# Patient Record
Sex: FEMALE | URBAN - METROPOLITAN AREA
[De-identification: names, ages, dates, MRNs, and addresses within clinical notes are randomized per-mention and may not be internally consistent; named-entity substitution may affect disease eponyms.]

---

## 2017-10-03 DIAGNOSIS — Z86.69 H/O HYDROCEPHALUS: Primary | ICD-10-CM

## 2017-10-03 NOTE — NURSING NOTE
Elisha has new numbness near her eye that is worrisome to her - she is due for her annual CT, and we will have her new symptoms evaluated by Dr Moss

## 2017-10-21 ASSESSMENT — ENCOUNTER SYMPTOMS
SPUTUM PRODUCTION: 0
FEVER: 0
PANIC: 0
SHORTNESS OF BREATH: 1
DEPRESSION: 1
MEMORY LOSS: 0
NIGHT SWEATS: 0
DISTURBANCES IN COORDINATION: 0
POLYDIPSIA: 1
DYSPNEA ON EXERTION: 1
POSTURAL DYSPNEA: 0
CHILLS: 1
POLYPHAGIA: 0
PARALYSIS: 0
WHEEZING: 0
FATIGUE: 1
DECREASED APPETITE: 0
WEAKNESS: 0
SPEECH CHANGE: 0
INCREASED ENERGY: 0
SNORES LOUDLY: 0
COUGH DISTURBING SLEEP: 0
TREMORS: 0
NUMBNESS: 1
LOSS OF CONSCIOUSNESS: 0
ALTERED TEMPERATURE REGULATION: 1
HALLUCINATIONS: 0
DECREASED CONCENTRATION: 1
HEADACHES: 1
INSOMNIA: 1
RESPIRATORY PAIN: 0
SEIZURES: 0
TINGLING: 0
DIZZINESS: 0
WEIGHT LOSS: 0
NERVOUS/ANXIOUS: 1
COUGH: 1
WEIGHT GAIN: 0
HEMOPTYSIS: 0

## 2017-10-25 ENCOUNTER — OFFICE VISIT (OUTPATIENT)
Dept: NEUROSURGERY | Facility: CLINIC | Age: 27
End: 2017-10-25

## 2017-10-25 VITALS
OXYGEN SATURATION: 98 % | TEMPERATURE: 98.1 F | DIASTOLIC BLOOD PRESSURE: 70 MMHG | HEIGHT: 67 IN | RESPIRATION RATE: 20 BRPM | BODY MASS INDEX: 30.98 KG/M2 | WEIGHT: 197.4 LBS | SYSTOLIC BLOOD PRESSURE: 118 MMHG | HEART RATE: 74 BPM

## 2017-10-25 DIAGNOSIS — G91.9 HYDROCEPHALUS (H): Primary | ICD-10-CM

## 2017-10-25 DIAGNOSIS — G50.0 TRIGEMINAL NEURALGIA: ICD-10-CM

## 2017-10-25 RX ORDER — ACETAMINOPHEN AND CODEINE PHOSPHATE 120; 12 MG/5ML; MG/5ML
1 SOLUTION ORAL DAILY
COMMUNITY
Start: 2017-08-18

## 2017-10-25 ASSESSMENT — PAIN SCALES - GENERAL: PAINLEVEL: NO PAIN (0)

## 2017-10-25 NOTE — LETTER
"10/25/2017       RE: Elisha Theodore  [address]  [address]  [place] [address]     Dear Colleague,    Thank you for referring your patient, Elisha Theodore, to the [facility] NEUROSURGERY at [facility]. Please see a copy of my visit note below.    Neurosurgery Clinic     10/25/17    HPI:   Elisha Theodore is a 27 year old female with history of prior microvascular decompression on the RIGHT side in 12/2013. As part of her workup, she was found to have hydrocephalus with findings of aqueductal stenosis. As such, the patient underwent an endoscopic third ventriculostomy in 11/2013.     She presents today for routine follow-up. Overall, she is doing well but reports some new numbness in her V2 region, especially karine-orbital. This numbness is sporadic in nature and comes and goes throughout any given month. At its most severe, she has it about 4x/day, but it is possible for her to be asymptomatic as well. She reports resolution of her trigeminal neuralgia pain and denies any recurrence at this time. She does report some RIGHT eye blurriness that comes and goes. She denies any imbalance or incontinence, no tingling or numbness elsewhere in the body, or focal muscle weakness.     Overall, the patient is doing well and has no new health diagnoses since the last visit. The patient continues to live with her mother and is not employed at present. She would like to pursue a health-related field sometime in the future but not actively doing so at present.     Physical exam:  /70  Pulse 74  Temp 98.1  F (36.7  C)  Resp 20  Ht 1.702 m (5' 7\")  Wt 89.5 kg (197 lb 6.4 oz)  SpO2 98%  Breastfeeding? No  BMI 30.92 kg/m2    General: appears comfortable, in no acute distress. Accompanied by mother.   NEURO:  CN: bilateral corneal reflexes are intact. Sensation to pinprick is intact bilaterally in all trigeminal distributions. PERRL bilaterally. EOMI. Symmetric eyelid elevation, palate " "elevation, tongue protrusion, and smile. 5/5 SCM bilaterally. No aphasia or dysarthria.   Motor: No pronator drift     Deltoid Triceps Biceps Wrist Ext Wrist Flex    R 5 5 5 5 5 5   L 5 5 5 5 5 5    Hip Flex. Knee Ext. Knee Flex. Dorsiflex. Plantarflex.    R 5 5 5 5 5    L 5 5 5 5 5      Sensory: Intact to light touch bilaterally in upper and lower extremities   Reflexes: biceps and brachioradialis tendons are 2+ bilaterally     Imaging:    CT head 10/25/17: no new intracranial pathology and unchanged ventriculomegaly     Assessment:  Elisha Theodore is a 27 year old female with history of prior microvascular decompression on the RIGHT side in 12/2013 for trigeminal neuralgia and hydrocephalus with findings of aqueductal stenosis s/p endoscopic third ventriculostomy in 11/2013. Ms. Vega is reporting some increased numbness in her RIGHT V2 distribution. We discussed that this may be related to her MVD with a possible inflammatory reaction to the ashutosh used to achieve decompression of the nerve. Because of the rarity of this complication, we discussed with the patient that imaging is not indicated at this time. However, if the patient were to notice increasing severity or frequency of symptoms, this would be an indication to obtain more diagnostic imaging beyond the CT obtained for this visit.     Plan:   --trial of ibuprofen whenever numbness sets in  --consider MRI brain if symptoms progress with associated follow-up visit in clinic    Javed \"Jimmy\" MD [facility]   Neurosurgery, PGY-1    I have personally examined the patient, reviewed the imaging and reviewed and edited the resident's note and agree with the plan of care. My comments have been separately dictated. - Mason Moss MD          HISTORY OF PRESENT ILLNESS:  Ms. Theodore is seen today at her request because of some intermittent numbness around her right eye.  A detailed summary of today's visit has been entered into the record by my resident, Jimmy Alegria, which " I have reviewed, amended and incorporated into my own.      We did a microvascular decompression for Susan several years ago and she has had no trigeminal neuralgia since that time.  Recently, she has experienced these intermittent episodes of numbness around the right eye but no trigeminal pain.      PHYSICAL EXAMINATION:  Her examination was intact, and her corneal reflex was intact.      IMAGING:  She had a CT scan which showed no significant lesions.      IMPRESSION/PLAN:  I explained to her that the CT scan rules out any major structural problems but is not a very detailed in the region of the trigeminal nerve.      I indicated to her that this is an unusual symptom to follow microvascular decompression and I am not actually certain about what the source might be.      I did tell her that there is a very rare situation in which some people will develop an inflammatory response to the Aaron pledgets several years after surgery.  This would be detectable only with an MRI scan.      Since the symptoms are intermittent and not severe, I suggested that she try treating them with ibuprofen 600 mg a day for about 2 weeks to see if it has any effect on the frequency or severity of symptoms.  I told her that if things get better on their own, we will not have to do anything, but if she feels that they are very persistent or getting worse, then she should call us and we will obtain an MRI scan to determine whether or not there is an inflammatory reaction around the Aaron.         DEIRDRE SPENCER MD             D: 10/25/2017 16:58   T: 10/26/2017 08:39   MT: CORAL      Name:     SUSAN STANTON   MRN:      [id number]        Account:      [id number]   :      1990           Service Date: 10/25/2017      Document: [id number]

## 2017-10-25 NOTE — MR AVS SNAPSHOT
"              After Visit Summary   10/25/2017    Elisha Theodore    MRN: [id number]           Patient Information     Date Of Birth          1990        Visit Information        Provider Department      10/25/2017 10:15 AM Mason Moss MD [facility] Neurosurgery        Today's Diagnoses     Hydrocephalus    -  1    Trigeminal neuralgia           Follow-ups after your visit        Who to contact     Please call your clinic at [phone] to:    Ask questions about your health    Make or cancel appointments    Discuss your medicines    Learn about your test results    Speak to your doctor   If you have compliments or concerns about an experience at your clinic, or if you wish to file a complaint, please contact [facility] Patient Relations at [phone] or email us at [email or web address]         Additional Information About Your Visit        MyChart Information     [facility] gives you secure access to your electronic health record. If you see a primary care provider, you can also send messages to your care team and make appointments. If you have questions, please call your primary care clinic.  If you do not have a primary care provider, please call [phone] and they will assist you.      [employer] is an electronic gateway that provides easy, online access to your medical records. With [facility], you can request a clinic appointment, read your test results, renew a prescription or communicate with your care team.     To access your existing account, please contact your [facility] or call [phone] for assistance.        Care EveryWhere ID     This is your Care EveryWhere ID. This could be used by other organizations to access your [place] medical records  [id number]        Your Vitals Were     Pulse Temperature Respirations Height Pulse Oximetry Breastfeeding?    74 98.1  F (36.7  C) 20 1.702 m (5' 7\") 98% No    BMI (Body Mass " Index)                   30.92 kg/m2            Blood Pressure from Last 3 Encounters:   10/25/17 118/70   10/02/15 124/80   02/05/14 126/69    Weight from Last 3 Encounters:   10/25/17 89.5 kg (197 lb 6.4 oz)   10/02/15 100.5 kg (221 lb 9.6 oz)   02/05/14 101.2 kg (223 lb)              Today, you had the following     No orders found for display       Primary Care Provider Office Phone # Fax #    Dontae Ames [phone]       [facility] [address]  [facility] [address]        Equal Access to Services     [facility]: Hadii azra mckeon hadasho Sosanty, waaxda luqadaha, qaybta kaalmada adeegyada, virginia myers . So [facility] [phone].    ATENCIÓN: Si habla español, tiene a james disposición servicios gratuitos de asistencia lingüística. Llame al [phone].    We comply with applicable federal civil rights laws and [place] laws. We do not discriminate on the basis of race, color, national origin, age, disability, sex, sexual orientation, or gender identity.            Thank you!     Thank you for choosing [facility]  for your care. Our goal is always to provide you with excellent care. Hearing back from our patients is one way we can continue to improve our services. Please take a few minutes to complete the written survey that you may receive in the mail after your visit with us. Thank you!             Your Updated Medication List - Protect others around you: Learn how to safely use, store and throw away your medicines at www.disposemymeds.org.          This list is accurate as of: 10/25/17 11:59 PM.  Always use your most recent med list.                   Brand Name Dispense Instructions for use Diagnosis    acetaminophen 325 MG tablet    TYLENOL    100 tablet    Take 2 tablets (650 mg) by mouth every 4 hours as needed for fever (for temperature greater than 99.5 F (37.5 C))    Trigeminal neuralgia       FLUoxetine HCl (PMDD)  10 MG Caps      Take 1 capsule by mouth daily        Levothyroxine Sodium 50 MCG Caps      Take 1 tablet by mouth daily        norethindrone 0.35 MG per tablet    MICRONOR     Take 1 tablet by mouth daily

## 2017-10-25 NOTE — NURSING NOTE
Chief Complaint   Patient presents with     Consult     UMP- RIGHT SIDED FACIAL NUMBNESS     Alpesh Jaramillo, CMA

## 2017-10-26 NOTE — PROGRESS NOTES
HISTORY OF PRESENT ILLNESS:  Ms. Stanton is seen today at her request because of some intermittent numbness around her right eye.  A detailed summary of today's visit has been entered into the record by my resident, Jimmy Alegria, which I have reviewed, amended and incorporated into my own.      We did a microvascular decompression for Susan several years ago and she has had no trigeminal neuralgia since that time.  Recently, she has experienced these intermittent episodes of numbness around the right eye but no trigeminal pain.      PHYSICAL EXAMINATION:  Her examination was intact, and her corneal reflex was intact.      IMAGING:  She had a CT scan which showed no significant lesions.      IMPRESSION/PLAN:  I explained to her that the CT scan rules out any major structural problems but is not a very detailed in the region of the trigeminal nerve.      I indicated to her that this is an unusual symptom to follow microvascular decompression and I am not actually certain about what the source might be.      I did tell her that there is a very rare situation in which some people will develop an inflammatory response to the Aaron pledgets several years after surgery.  This would be detectable only with an MRI scan.      Since the symptoms are intermittent and not severe, I suggested that she try treating them with ibuprofen 600 mg a day for about 2 weeks to see if it has any effect on the frequency or severity of symptoms.  I told her that if things get better on their own, we will not have to do anything, but if she feels that they are very persistent or getting worse, then she should call us and we will obtain an MRI scan to determine whether or not there is an inflammatory reaction around the Aaron.         DEIRDRE SPENCER MD             D: 10/25/2017 16:58   T: 10/26/2017 08:39   MT: CORAL      Name:     SUSAN STANTON   MRN:      [id number]        Account:      [id number]   :      1990           Service  Date: 10/25/2017      Document: [id number]

## 2017-10-28 NOTE — PROGRESS NOTES
"Neurosurgery Clinic     10/25/17    HPI:   Elisha Theodore is a 27 year old female with history of prior microvascular decompression on the RIGHT side in 12/2013. As part of her workup, she was found to have hydrocephalus with findings of aqueductal stenosis. As such, the patient underwent an endoscopic third ventriculostomy in 11/2013.     She presents today for routine follow-up. Overall, she is doing well but reports some new numbness in her V2 region, especially karine-orbital. This numbness is sporadic in nature and comes and goes throughout any given month. At its most severe, she has it about 4x/day, but it is possible for her to be asymptomatic as well. She reports resolution of her trigeminal neuralgia pain and denies any recurrence at this time. She does report some RIGHT eye blurriness that comes and goes. She denies any imbalance or incontinence, no tingling or numbness elsewhere in the body, or focal muscle weakness.     Overall, the patient is doing well and has no new health diagnoses since the last visit. The patient continues to live with her mother and is not employed at present. She would like to pursue a health-related field sometime in the future but not actively doing so at present.     Physical exam:  /70  Pulse 74  Temp 98.1  F (36.7  C)  Resp 20  Ht 1.702 m (5' 7\")  Wt 89.5 kg (197 lb 6.4 oz)  SpO2 98%  Breastfeeding? No  BMI 30.92 kg/m2    General: appears comfortable, in no acute distress. Accompanied by mother.   NEURO:  CN: bilateral corneal reflexes are intact. Sensation to pinprick is intact bilaterally in all trigeminal distributions. PERRL bilaterally. EOMI. Symmetric eyelid elevation, palate elevation, tongue protrusion, and smile. 5/5 SCM bilaterally. No aphasia or dysarthria.   Motor: No pronator drift     Deltoid Triceps Biceps Wrist Ext Wrist Flex    R 5 5 5 5 5 5   L 5 5 5 5 5 5    Hip Flex. Knee Ext. Knee Flex. Dorsiflex. Plantarflex.    R 5 5 5 5 5    L 5 5 5 5 " "5      Sensory: Intact to light touch bilaterally in upper and lower extremities   Reflexes: biceps and brachioradialis tendons are 2+ bilaterally     Imaging:    CT head 10/25/17: no new intracranial pathology and unchanged ventriculomegaly     Assessment:  Elisha Theodore is a 27 year old female with history of prior microvascular decompression on the RIGHT side in 12/2013 for trigeminal neuralgia and hydrocephalus with findings of aqueductal stenosis s/p endoscopic third ventriculostomy in 11/2013. Ms. Vega is reporting some increased numbness in her RIGHT V2 distribution. We discussed that this may be related to her MVD with a possible inflammatory reaction to the ashutosh used to achieve decompression of the nerve. Because of the rarity of this complication, we discussed with the patient that imaging is not indicated at this time. However, if the patient were to notice increasing severity or frequency of symptoms, this would be an indication to obtain more diagnostic imaging beyond the CT obtained for this visit.     Plan:   --trial of ibuprofen whenever numbness sets in  --consider MRI brain if symptoms progress with associated follow-up visit in clinic    Javed \"Jimmy\" MD [facility]   Neurosurgery, PGY-1    I have personally examined the patient, reviewed the imaging and reviewed and edited the resident's note and agree with the plan of care. My comments have been separately dictated. - Mason Moss MD        "

## 2017-12-03 ENCOUNTER — HEALTH MAINTENANCE LETTER (OUTPATIENT)
Age: 27
End: 2017-12-03

## 2020-03-02 ENCOUNTER — HEALTH MAINTENANCE LETTER (OUTPATIENT)
Age: 30
End: 2020-03-02

## 2020-12-20 ENCOUNTER — HEALTH MAINTENANCE LETTER (OUTPATIENT)
Age: 30
End: 2020-12-20

## 2021-04-18 ENCOUNTER — HEALTH MAINTENANCE LETTER (OUTPATIENT)
Age: 31
End: 2021-04-18

## 2021-09-15 ENCOUNTER — TRANSCRIBE ORDERS (OUTPATIENT)
Dept: OTHER | Age: 31
End: 2021-09-15

## 2021-09-15 DIAGNOSIS — G91.9 HYDROCEPHALUS (H): Primary | ICD-10-CM

## 2021-09-17 NOTE — TELEPHONE ENCOUNTER
RECORDS RECEIVED FROM: Internal (Isa pt)   REASON FOR VISIT: Establish care/Hydrocephalus   Date of Appt: 11/23/21 (resched from 10/26/21)   NOTES (FOR ALL VISITS) STATUS DETAILS   OFFICE NOTE from referring provider Internal Dr Moss @ [facility] Neurosurgery:  10/25/17  1/12/15  2/5/14  10/13/13   OFFICE NOTE from other specialist N/A    DISCHARGE SUMMARY from hospital N/A    DISCHARGE REPORT from the ER N/A    OPERATIVE REPORT Internal [facility]:  12/9/13  Microvascular Decompression Of Right Trigeminal Nerve, Fat Graft from abdomen    [facility]:  11/14/13  Endoscopic 3rd Ventriculostomy   MEDICATION LIST Internal    IMAGING  (FOR ALL VISITS)     EMG N/A    EEG N/A    LUMBAR PUNCTURE N/A    DAMIEN SCAN N/A    ULTRASOUND (CAROTID BILAT) *VASCULAR* N/A    MRI (HEAD, NECK, SPINE) N/A    CT (HEAD, NECK, SPINE) Internal [facility]:  CT Head 11/23/21  CT Head 10/25/17  CT Head 9/12/16    [facility]:  CT Head 10/2/15  CT Head 4/1/14

## 2021-09-21 ENCOUNTER — TELEPHONE (OUTPATIENT)
Dept: NEUROSURGERY | Facility: CLINIC | Age: 31
End: 2021-09-21

## 2021-09-21 DIAGNOSIS — G91.2 IDIOPATHIC NORMAL PRESSURE HYDROCEPHALUS (H): Primary | ICD-10-CM

## 2021-10-03 ENCOUNTER — HEALTH MAINTENANCE LETTER (OUTPATIENT)
Age: 31
End: 2021-10-03

## 2021-10-26 ENCOUNTER — PRE VISIT (OUTPATIENT)
Dept: NEUROSURGERY | Facility: CLINIC | Age: 31
End: 2021-10-26

## 2021-10-27 ENCOUNTER — TELEPHONE (OUTPATIENT)
Dept: NEUROSURGERY | Facility: CLINIC | Age: 31
End: 2021-10-27

## 2021-11-23 ENCOUNTER — OFFICE VISIT (OUTPATIENT)
Dept: NEUROSURGERY | Facility: CLINIC | Age: 31
End: 2021-11-23
Attending: FAMILY MEDICINE
Payer: COMMERCIAL

## 2021-11-23 ENCOUNTER — ANCILLARY PROCEDURE (OUTPATIENT)
Dept: CT IMAGING | Facility: CLINIC | Age: 31
End: 2021-11-23
Attending: NEUROLOGICAL SURGERY
Payer: COMMERCIAL

## 2021-11-23 VITALS
HEART RATE: 78 BPM | WEIGHT: 181.7 LBS | RESPIRATION RATE: 16 BRPM | HEIGHT: 67 IN | OXYGEN SATURATION: 98 % | DIASTOLIC BLOOD PRESSURE: 83 MMHG | SYSTOLIC BLOOD PRESSURE: 114 MMHG | BODY MASS INDEX: 28.52 KG/M2

## 2021-11-23 DIAGNOSIS — G91.2 IDIOPATHIC NORMAL PRESSURE HYDROCEPHALUS (H): Primary | ICD-10-CM

## 2021-11-23 DIAGNOSIS — G91.2 IDIOPATHIC NORMAL PRESSURE HYDROCEPHALUS (H): ICD-10-CM

## 2021-11-23 PROCEDURE — 99203 OFFICE O/P NEW LOW 30 MIN: CPT | Performed by: NEUROLOGICAL SURGERY

## 2021-11-23 PROCEDURE — 70450 CT HEAD/BRAIN W/O DYE: CPT | Mod: GC | Performed by: RADIOLOGY

## 2021-11-23 RX ORDER — ALBUTEROL SULFATE 90 UG/1
AEROSOL, METERED RESPIRATORY (INHALATION)
COMMUNITY
Start: 2021-11-18

## 2021-11-23 RX ORDER — BETAMETHASONE DIPROPIONATE 0.5 MG/G
OINTMENT, AUGMENTED TOPICAL
COMMUNITY
Start: 2021-05-19

## 2021-11-23 RX ORDER — CLOBETASOL PROPIONATE 0.5 MG/G
OINTMENT TOPICAL
COMMUNITY
Start: 2021-05-19

## 2021-11-23 ASSESSMENT — MIFFLIN-ST. JEOR: SCORE: 1576.82

## 2021-11-23 ASSESSMENT — PAIN SCALES - GENERAL: PAINLEVEL: NO PAIN (0)

## 2021-11-23 NOTE — PATIENT INSTRUCTIONS
Thank you for choosing [facility]. You were seen in the Neurosurgery Clinic today with Dr. Mendenhall.    Next steps:  1. Return to clinic in 2 years with Dr. Mendenhall.     Please don't hesitate to reach out via MyChart or telephone if you have any questions after your visit.    Kassandra Valadez RN Care Coordinator, Neurosurgery    Direct: [phone]  Neurosurgery Clinic: [phone]

## 2021-11-23 NOTE — PROGRESS NOTES
Service Date: 2021    Dontae Ames MD  [facility]  [address]  [place], [place], [address]    RE:       Elisha Theodore    MRN:   [id number]    :    1990        Dear Dr. Ames:     I had the opportunity to see Ms. Theodore today in my clinic with her mother.  She is a 31-year-old woman who we have known for many years.  She had originally right-sided trigeminal neuralgia that was treated by Dr. Moss was some microvascular decompression and has had an excellent result with that.  She also has obstructive hydrocephalus for which she received a third ventriculostomy.  She has done well from that.  She has some headaches, but it is intermittent.      Her main reason for coming today is that she has been having postnasal dripping.  Her mother is concerned whether this could be CSF that is leaking out her nose.  For that reason, she had come to see us.  She says that the dripping is not sufficient for her to collect easily.  She does blow her nose.  She does not notice any pooling.  She has not had any cold symptoms.  She has no ongoing headaches.  They type of headaches she has last for several days and then goes away and then it may come back weeks later.  She does have some drainage from her right eye.  She is not having any it currently and it does not bother her too much.  She has no pain along her incisions both in her retromastoid approach nor her quincy hole.      On examination, her extraocular muscles are normal.  Vision is grossly intact.  Face is symmetric.  Tongue is midline.  Uvula is midline.  Facial sensation is normal.  Shoulder shrug is normal.  She has no drift.  Rapid alternating movements are normal.  Gait and station are normal.  She feels that her gait actually improved after her third ventriculostomy.    The CT scan today shows that the ventricles are much smaller than before the operation.  In addition, it shows the cranial plate to be in good position.  She has no  fluid inside her sinuses.  This included her mastoids as well as her frontal sinuses.  She also has no air inside her head.    I assured the patient and her mother that the fluid coming from the nose is very unlikely to be CSF.  I see no indications of that on the CT scan.  I told her, however, that if it does drain enough that she is able to collect it, to please let us know because we can certainly send it for beta-2 transferrin.  I do not think that this is absolutely necessary since she is unable to do so at the current time.  As far as her MVD, she is having persistent good benefit from the surgery and we will just follow in this regard.  I will see her again in 2 years' time.  She wishes to make the appointment herself at that point.    Sincerely,     Serge Mendenhall MD        D: 2021   T: 2021   MT: Jazmin    Name:     SUSAN STANTON  MRN:      [id number]        Account:      [id number]   :      1990           Service Date: 2021       Document: [id number]

## 2021-11-23 NOTE — NURSING NOTE
Chief Complaint   Patient presents with     Hydrocephalus     [facility] Dilip Peralta

## 2022-05-15 ENCOUNTER — HEALTH MAINTENANCE LETTER (OUTPATIENT)
Age: 32
End: 2022-05-15

## 2022-09-10 ENCOUNTER — HEALTH MAINTENANCE LETTER (OUTPATIENT)
Age: 32
End: 2022-09-10

## 2023-06-03 ENCOUNTER — HEALTH MAINTENANCE LETTER (OUTPATIENT)
Age: 33
End: 2023-06-03

## 2024-07-07 ENCOUNTER — HEALTH MAINTENANCE LETTER (OUTPATIENT)
Age: 34
End: 2024-07-07